# Patient Record
Sex: FEMALE | Race: WHITE | HISPANIC OR LATINO | URBAN - METROPOLITAN AREA
[De-identification: names, ages, dates, MRNs, and addresses within clinical notes are randomized per-mention and may not be internally consistent; named-entity substitution may affect disease eponyms.]

---

## 2021-08-10 ENCOUNTER — TELEPHONE ENCOUNTER (OUTPATIENT)
Dept: URBAN - METROPOLITAN AREA CLINIC 92 | Facility: CLINIC | Age: 31
End: 2021-08-10

## 2021-08-10 ENCOUNTER — OFFICE VISIT (OUTPATIENT)
Dept: URBAN - METROPOLITAN AREA TELEHEALTH 2 | Facility: TELEHEALTH | Age: 31
End: 2021-08-10
Payer: COMMERCIAL

## 2021-08-10 DIAGNOSIS — R10.32 LLQ ABDOMINAL PAIN: ICD-10-CM

## 2021-08-10 DIAGNOSIS — K57.32 DIVERTICULITIS OF LARGE INTESTINE WITHOUT PERFORATION OR ABSCESS WITHOUT BLEEDING: ICD-10-CM

## 2021-08-10 PROCEDURE — 99203 OFFICE O/P NEW LOW 30 MIN: CPT | Performed by: INTERNAL MEDICINE

## 2021-08-10 RX ORDER — METFORMIN HYDROCHLORIDE 500 MG/1
1 TABLET WITH A MEAL TABLET, FILM COATED ORAL ONCE A DAY
Status: ACTIVE | COMMUNITY

## 2021-08-10 NOTE — HPI-TODAY'S VISIT:
Ms. Browne is a 31-year-old female was seen today over telehealth for evaluation of having colonoscopy.  Patient stated she was diagnosed with a past episode of diverticulitis 3 to 4 months ago.  She was at the [facility] emergency room.  She went in with a left lower quadrant abdominal pain.  At the time she was at admits for having severe constipation.  She was seen by [facility] GI who recommended colonoscopy however she could not schedule it because of insurance issues.  She reports having weight loss but some of it was intentional because he was diagnosed with a type 2 diabetes.  Denies any NSAID use.  Other complaints includes a abdominal bloating and discomfort.  She was just diagnosed with being pregnant and she is about 6 weeks and she is waiting to go see GYN.

## 2021-12-22 ENCOUNTER — WEB ENCOUNTER (OUTPATIENT)
Dept: URBAN - NONMETROPOLITAN AREA CLINIC 4 | Facility: CLINIC | Age: 31
End: 2021-12-22

## 2021-12-22 ENCOUNTER — OFFICE VISIT (OUTPATIENT)
Dept: URBAN - NONMETROPOLITAN AREA CLINIC 4 | Facility: CLINIC | Age: 31
End: 2021-12-22
Payer: COMMERCIAL

## 2021-12-22 ENCOUNTER — OFFICE VISIT (OUTPATIENT)
Dept: URBAN - METROPOLITAN AREA CLINIC 54 | Facility: CLINIC | Age: 31
End: 2021-12-22

## 2021-12-22 VITALS
TEMPERATURE: 97.5 F | SYSTOLIC BLOOD PRESSURE: 127 MMHG | DIASTOLIC BLOOD PRESSURE: 73 MMHG | HEIGHT: 61 IN | HEART RATE: 93 BPM | BODY MASS INDEX: 46.07 KG/M2 | WEIGHT: 244 LBS

## 2021-12-22 DIAGNOSIS — K57.92 DIVERTICULITIS: ICD-10-CM

## 2021-12-22 DIAGNOSIS — Z34.90 INTRAUTERINE PREGNANCY: ICD-10-CM

## 2021-12-22 DIAGNOSIS — K59.00 CONSTIPATION, UNSPECIFIED CONSTIPATION TYPE: ICD-10-CM

## 2021-12-22 DIAGNOSIS — I10 HYPERTENSION, UNSPECIFIED TYPE: ICD-10-CM

## 2021-12-22 DIAGNOSIS — E11.9 TYPE 2 DIABETES MELLITUS WITHOUT COMPLICATION, WITHOUT LONG-TERM CURRENT USE OF INSULIN: ICD-10-CM

## 2021-12-22 DIAGNOSIS — E66.01 MORBID (SEVERE) OBESITY DUE TO EXCESS CALORIES: ICD-10-CM

## 2021-12-22 PROCEDURE — 99204 OFFICE O/P NEW MOD 45 MIN: CPT | Performed by: REGISTERED NURSE

## 2021-12-22 RX ORDER — METFORMIN HYDROCHLORIDE 500 MG/1
1 TABLET WITH A MEAL TABLET, FILM COATED ORAL ONCE A DAY
Status: DISCONTINUED | COMMUNITY

## 2021-12-22 RX ORDER — METFORMIN HYDROCHLORIDE 500 MG/1
1 TABLET WITH A MEAL TABLET, FILM COATED ORAL ONCE A DAY
Status: ACTIVE | COMMUNITY

## 2021-12-22 RX ORDER — INSULIN ASPART 100 [IU]/ML
AS DIRECTED INJECTION, SOLUTION INTRAVENOUS; SUBCUTANEOUS
Status: ACTIVE | COMMUNITY

## 2021-12-22 RX ORDER — KRILL/OM-3/DHA/EPA/PHOSPHO/AST 1000-230MG
1 TABLET CAPSULE ORAL ONCE A DAY
Status: ACTIVE | COMMUNITY

## 2021-12-22 RX ORDER — LABETALOL HYDROCHLORIDE 100 MG/1
1 TABLET TABLET ORAL TWICE A DAY
Status: ACTIVE | COMMUNITY

## 2021-12-22 NOTE — HPI-TODAY'S VISIT:
12/22/21: Pt is a 31 female with PMH ofrecurrent  diverticulitis, HTN, DM, obesity and is currently 22w6d pregnant who was referred by [facility] ED for evaluation of diverticulitis.   Pt seen in ED 11/7/21 with c/o LLQ pain and rectal bleeding. CT A/P showed uncomplicated inflamed epiploic appendage versus diverticulitis. There is no abscess or drainable fluid collection. Hepatosplenomegaly also noted. Pt was discharged home on Augmentin, which she completed. She is currently doing well with no further abdominal pain. She reports 3 episodes of diverticulitis since early this year. She reports occasional constipation. She currenlty takes Miralax nightly. She has noticed blood on toilet paper. Admits to straning with BMs. Paternal uncle had colon cancer.

## 2022-01-04 ENCOUNTER — TELEPHONE ENCOUNTER (OUTPATIENT)
Dept: URBAN - NONMETROPOLITAN AREA CLINIC 4 | Facility: CLINIC | Age: 32
End: 2022-01-04

## 2022-01-04 RX ORDER — ONDANSETRON HYDROCHLORIDE 4 MG/1
1 TABLET TABLET, FILM COATED ORAL EVERY 8 HOURS
Qty: 90 TABLET | Refills: 1 | OUTPATIENT
Start: 2022-01-04

## 2022-01-04 RX ORDER — AMOXICILLIN AND CLAVULANATE POTASSIUM 500; 125 MG/1; 1/1
1 TABLET TABLET, FILM COATED ORAL
Qty: 21 | OUTPATIENT
Start: 2022-01-04 | End: 2022-01-11

## 2022-03-21 ENCOUNTER — TELEPHONE ENCOUNTER (OUTPATIENT)
Dept: URBAN - NONMETROPOLITAN AREA CLINIC 4 | Facility: CLINIC | Age: 32
End: 2022-03-21

## 2022-03-23 ENCOUNTER — OFFICE VISIT (OUTPATIENT)
Dept: URBAN - METROPOLITAN AREA CLINIC 54 | Facility: CLINIC | Age: 32
End: 2022-03-23

## 2022-03-30 ENCOUNTER — OFFICE VISIT (OUTPATIENT)
Dept: URBAN - METROPOLITAN AREA CLINIC 54 | Facility: CLINIC | Age: 32
End: 2022-03-30

## 2022-07-19 ENCOUNTER — TELEPHONE ENCOUNTER (OUTPATIENT)
Dept: URBAN - NONMETROPOLITAN AREA CLINIC 4 | Facility: CLINIC | Age: 32
End: 2022-07-19

## 2022-07-19 RX ORDER — AMOXICILLIN AND CLAVULANATE POTASSIUM 500; 125 MG/1; 1/1
1 TABLET TABLET, FILM COATED ORAL
Qty: 21 | Refills: 0

## 2022-08-12 ENCOUNTER — OFFICE VISIT (OUTPATIENT)
Dept: URBAN - NONMETROPOLITAN AREA CLINIC 4 | Facility: CLINIC | Age: 32
End: 2022-08-12
Payer: COMMERCIAL

## 2022-08-12 ENCOUNTER — LAB OUTSIDE AN ENCOUNTER (OUTPATIENT)
Dept: URBAN - NONMETROPOLITAN AREA CLINIC 4 | Facility: CLINIC | Age: 32
End: 2022-08-12

## 2022-08-12 VITALS
TEMPERATURE: 97.9 F | SYSTOLIC BLOOD PRESSURE: 125 MMHG | HEART RATE: 79 BPM | DIASTOLIC BLOOD PRESSURE: 81 MMHG | HEIGHT: 61 IN | WEIGHT: 218.4 LBS | BODY MASS INDEX: 41.23 KG/M2

## 2022-08-12 DIAGNOSIS — E66.01 MORBID (SEVERE) OBESITY DUE TO EXCESS CALORIES: ICD-10-CM

## 2022-08-12 DIAGNOSIS — E11.9 TYPE 2 DIABETES MELLITUS WITHOUT COMPLICATION, WITHOUT LONG-TERM CURRENT USE OF INSULIN: ICD-10-CM

## 2022-08-12 DIAGNOSIS — F41.8 OTHER SPECIFIED ANXIETY DISORDERS: ICD-10-CM

## 2022-08-12 DIAGNOSIS — I10 HYPERTENSION, UNSPECIFIED TYPE: ICD-10-CM

## 2022-08-12 DIAGNOSIS — R00.2 PALPITATIONS: ICD-10-CM

## 2022-08-12 DIAGNOSIS — K57.92 DIVERTICULITIS: ICD-10-CM

## 2022-08-12 PROCEDURE — 99214 OFFICE O/P EST MOD 30 MIN: CPT | Performed by: REGISTERED NURSE

## 2022-08-12 RX ORDER — INSULIN ASPART 100 [IU]/ML
AS DIRECTED INJECTION, SOLUTION INTRAVENOUS; SUBCUTANEOUS
Status: ACTIVE | COMMUNITY

## 2022-08-12 RX ORDER — FERROUS SULFATE 325(65) MG
1 TABLET TABLET ORAL ONCE A DAY
Status: ACTIVE | COMMUNITY

## 2022-08-12 RX ORDER — LABETALOL HYDROCHLORIDE 100 MG/1
1 TABLET TABLET ORAL TWICE A DAY
Status: ACTIVE | COMMUNITY

## 2022-08-12 RX ORDER — KRILL/OM-3/DHA/EPA/PHOSPHO/AST 1000-230MG
1 TABLET CAPSULE ORAL ONCE A DAY
Status: ACTIVE | COMMUNITY

## 2022-08-12 RX ORDER — AMOXICILLIN AND CLAVULANATE POTASSIUM 500; 125 MG/1; 1/1
1 TABLET TABLET, FILM COATED ORAL
Qty: 21 | Refills: 0 | Status: DISCONTINUED | COMMUNITY

## 2022-08-12 RX ORDER — INSULIN GLARGINE 100 [IU]/ML
AS DIRECTED INJECTION, SOLUTION SUBCUTANEOUS
Status: ACTIVE | COMMUNITY

## 2022-08-12 RX ORDER — ONDANSETRON HYDROCHLORIDE 4 MG/1
1 TABLET TABLET, FILM COATED ORAL EVERY 8 HOURS
Qty: 90 TABLET | Refills: 1 | Status: DISCONTINUED | COMMUNITY
Start: 2022-01-04

## 2022-08-12 RX ORDER — POLYETHYLENE GLYCOL-3350 AND ELECTROLYTES 236; 6.74; 5.86; 2.97; 22.74 G/274.31G; G/274.31G; G/274.31G; G/274.31G; G/274.31G
AS DIRECTED POWDER, FOR SOLUTION ORAL
Qty: 1 KIT | Refills: 0 | OUTPATIENT

## 2022-08-12 NOTE — HPI-TODAY'S VISIT:
12/22/21: Pt is a 31 female with PMH ofrecurrent  diverticulitis, HTN, DM, obesity and is currently 22w6d pregnant who was referred by [facility] ED for evaluation of diverticulitis.   Pt seen in ED 11/7/21 with c/o LLQ pain and rectal bleeding. CT A/P showed uncomplicated inflamed epiploic appendage versus diverticulitis. There is no abscess or drainable fluid collection. Hepatosplenomegaly also noted. Pt was discharged home on Augmentin, which she completed. She is currently doing well with no further abdominal pain. She reports 3 episodes of diverticulitis since early this year. She reports occasional constipation. She currenlty takes Miralax nightly. She has noticed blood on toilet paper. Admits to straning with BMs. Paternal uncle had colon cancer.  8/12/22: Pt RTC for f/u. She is 4 months postpartum. She reports heart palpations and is currently wearing a heart monitor. She has f/u with cards 8/26/22. She denies further abdomnial pain.

## 2022-08-22 ENCOUNTER — TELEPHONE ENCOUNTER (OUTPATIENT)
Dept: URBAN - METROPOLITAN AREA CLINIC 6 | Facility: CLINIC | Age: 32
End: 2022-08-22

## 2022-08-22 RX ORDER — AMOXICILLIN AND CLAVULANATE POTASSIUM 500; 125 MG/1; 1/1
1 TABLET TABLET, FILM COATED ORAL
Qty: 21 | Refills: 0
End: 2022-08-29

## 2022-09-14 ENCOUNTER — TELEPHONE ENCOUNTER (OUTPATIENT)
Dept: URBAN - NONMETROPOLITAN AREA CLINIC 4 | Facility: CLINIC | Age: 32
End: 2022-09-14

## 2022-09-16 ENCOUNTER — OFFICE VISIT (OUTPATIENT)
Dept: URBAN - NONMETROPOLITAN AREA CLINIC 4 | Facility: CLINIC | Age: 32
End: 2022-09-16
Payer: COMMERCIAL

## 2022-09-16 VITALS
HEIGHT: 61 IN | SYSTOLIC BLOOD PRESSURE: 122 MMHG | DIASTOLIC BLOOD PRESSURE: 79 MMHG | HEART RATE: 71 BPM | BODY MASS INDEX: 40.67 KG/M2 | TEMPERATURE: 96.7 F | WEIGHT: 215.4 LBS

## 2022-09-16 DIAGNOSIS — E11.9 TYPE 2 DIABETES MELLITUS WITHOUT COMPLICATION, WITHOUT LONG-TERM CURRENT USE OF INSULIN: ICD-10-CM

## 2022-09-16 DIAGNOSIS — F41.8 OTHER SPECIFIED ANXIETY DISORDERS: ICD-10-CM

## 2022-09-16 DIAGNOSIS — I10 HYPERTENSION, UNSPECIFIED TYPE: ICD-10-CM

## 2022-09-16 DIAGNOSIS — R00.2 PALPITATIONS: ICD-10-CM

## 2022-09-16 DIAGNOSIS — R10.12 LEFT UPPER QUADRANT ABDOMINAL PAIN: ICD-10-CM

## 2022-09-16 DIAGNOSIS — K57.92 DIVERTICULITIS: ICD-10-CM

## 2022-09-16 DIAGNOSIS — E66.01 MORBID (SEVERE) OBESITY DUE TO EXCESS CALORIES: ICD-10-CM

## 2022-09-16 PROCEDURE — 99214 OFFICE O/P EST MOD 30 MIN: CPT | Performed by: REGISTERED NURSE

## 2022-09-16 RX ORDER — INSULIN ASPART 100 [IU]/ML
AS DIRECTED INJECTION, SOLUTION INTRAVENOUS; SUBCUTANEOUS
Status: ACTIVE | COMMUNITY

## 2022-09-16 RX ORDER — INSULIN GLARGINE 100 [IU]/ML
AS DIRECTED INJECTION, SOLUTION SUBCUTANEOUS
Status: ACTIVE | COMMUNITY

## 2022-09-16 RX ORDER — LABETALOL HYDROCHLORIDE 100 MG/1
1 TABLET TABLET ORAL TWICE A DAY
Status: ACTIVE | COMMUNITY

## 2022-09-16 RX ORDER — KRILL/OM-3/DHA/EPA/PHOSPHO/AST 1000-230MG
1 TABLET CAPSULE ORAL ONCE A DAY
Status: ACTIVE | COMMUNITY

## 2022-09-16 RX ORDER — POLYETHYLENE GLYCOL-3350 AND ELECTROLYTES 236; 6.74; 5.86; 2.97; 22.74 G/274.31G; G/274.31G; G/274.31G; G/274.31G; G/274.31G
AS DIRECTED POWDER, FOR SOLUTION ORAL
Qty: 1 KIT | Refills: 0 | Status: ACTIVE | COMMUNITY

## 2022-09-16 RX ORDER — FERROUS SULFATE 325(65) MG
1 TABLET TABLET ORAL ONCE A DAY
Status: ACTIVE | COMMUNITY

## 2022-09-16 NOTE — HPI-TODAY'S VISIT:
12/22/21: Pt is a 31 female with PMH of recurrent  diverticulitis, HTN, DM, obesity and is currently 22w6d pregnant who was referred by [facility] ED for evaluation of diverticulitis.   Pt seen in ED 11/7/21 with c/o LLQ pain and rectal bleeding. CT A/P showed uncomplicated inflamed epiploic appendage versus diverticulitis. There is no abscess or drainable fluid collection. Hepatosplenomegaly also noted. Pt was discharged home on Augmentin, which she completed. She is currently doing well with no further abdominal pain. She reports 3 episodes of diverticulitis since early this year. She reports occasional constipation. She currenlty takes Miralax nightly. She has noticed blood on toilet paper. Admits to straning with BMs. Paternal uncle had colon cancer.  8/12/22: Pt RTC for f/u. She is 4 months postpartum. She reports heart palpations and is currently wearing a heart monitor. She has f/u with cards 8/26/22. She denies further abdomnial pain.  9/16/22: Pt RTC for hospital f/u. She has been seen in the ED 3 times over the past 4 days with c/o LUQ abdominal pain and nausea. States she had eaten [employer] and taken Abx and antifungal medication on empty stomach prior to symptom onset. In the ED, CT scan showed no abnormalities and labs normal.  She received GI coctail, which helped. Denies any vomiting, fever, chest pain, shortness of breath, bowel changes, urinary complaints or sick contacts. Since discharge from ED on 9/14, she feels much better. She admits to feeling very anxious and worries she has cancer. States she was supposed to see a psychiatrist, but appt got canceled.

## 2022-09-27 PROBLEM — 307496006: Status: ACTIVE | Noted: 2021-12-22

## 2022-09-29 ENCOUNTER — OFFICE VISIT (OUTPATIENT)
Dept: URBAN - METROPOLITAN AREA SURGERY CENTER 14 | Facility: SURGERY CENTER | Age: 32
End: 2022-09-29

## 2022-10-06 ENCOUNTER — TELEPHONE ENCOUNTER (OUTPATIENT)
Dept: URBAN - NONMETROPOLITAN AREA CLINIC 4 | Facility: CLINIC | Age: 32
End: 2022-10-06

## 2022-10-06 RX ORDER — DICYCLOMINE HYDROCHLORIDE 20 MG/1
1 TABLET TABLET ORAL THREE TIMES A DAY
Qty: 90 | Refills: 1 | OUTPATIENT

## 2022-10-19 ENCOUNTER — OFFICE VISIT (OUTPATIENT)
Dept: URBAN - METROPOLITAN AREA SURGERY CENTER 14 | Facility: SURGERY CENTER | Age: 32
End: 2022-10-19
Payer: COMMERCIAL

## 2022-10-19 DIAGNOSIS — R10.84 ABDOMINAL CRAMPING, GENERALIZED: ICD-10-CM

## 2022-10-19 PROCEDURE — 45378 DIAGNOSTIC COLONOSCOPY: CPT | Performed by: INTERNAL MEDICINE

## 2022-10-19 PROCEDURE — G8907 PT DOC NO EVENTS ON DISCHARG: HCPCS | Performed by: INTERNAL MEDICINE

## 2022-10-19 RX ORDER — DICYCLOMINE HYDROCHLORIDE 20 MG/1
1 TABLET TABLET ORAL THREE TIMES A DAY
Qty: 90 | Refills: 1 | Status: ACTIVE | COMMUNITY

## 2022-10-19 RX ORDER — POLYETHYLENE GLYCOL-3350 AND ELECTROLYTES 236; 6.74; 5.86; 2.97; 22.74 G/274.31G; G/274.31G; G/274.31G; G/274.31G; G/274.31G
AS DIRECTED POWDER, FOR SOLUTION ORAL
Qty: 1 KIT | Refills: 0 | Status: ACTIVE | COMMUNITY

## 2022-10-19 RX ORDER — FERROUS SULFATE 325(65) MG
1 TABLET TABLET ORAL ONCE A DAY
Status: ACTIVE | COMMUNITY

## 2022-10-19 RX ORDER — INSULIN ASPART 100 [IU]/ML
AS DIRECTED INJECTION, SOLUTION INTRAVENOUS; SUBCUTANEOUS
Status: ACTIVE | COMMUNITY

## 2022-10-19 RX ORDER — LABETALOL HYDROCHLORIDE 100 MG/1
1 TABLET TABLET ORAL TWICE A DAY
Status: ACTIVE | COMMUNITY

## 2022-10-19 RX ORDER — KRILL/OM-3/DHA/EPA/PHOSPHO/AST 1000-230MG
1 TABLET CAPSULE ORAL ONCE A DAY
Status: ACTIVE | COMMUNITY

## 2022-10-19 RX ORDER — INSULIN GLARGINE 100 [IU]/ML
AS DIRECTED INJECTION, SOLUTION SUBCUTANEOUS
Status: ACTIVE | COMMUNITY

## 2022-10-20 ENCOUNTER — OFFICE VISIT (OUTPATIENT)
Dept: URBAN - NONMETROPOLITAN AREA CLINIC 4 | Facility: CLINIC | Age: 32
End: 2022-10-20

## 2022-11-08 ENCOUNTER — TELEPHONE ENCOUNTER (OUTPATIENT)
Dept: URBAN - NONMETROPOLITAN AREA CLINIC 4 | Facility: CLINIC | Age: 32
End: 2022-11-08

## 2022-11-08 RX ORDER — DICYCLOMINE HYDROCHLORIDE 20 MG/1
1 TABLET TABLET ORAL THREE TIMES A DAY
Qty: 90 | Refills: 1
End: 2023-01-07

## 2022-11-18 ENCOUNTER — OFFICE VISIT (OUTPATIENT)
Dept: URBAN - NONMETROPOLITAN AREA CLINIC 4 | Facility: CLINIC | Age: 32
End: 2022-11-18
Payer: COMMERCIAL

## 2022-11-18 VITALS
HEART RATE: 77 BPM | TEMPERATURE: 97.3 F | WEIGHT: 210 LBS | SYSTOLIC BLOOD PRESSURE: 117 MMHG | BODY MASS INDEX: 39.65 KG/M2 | DIASTOLIC BLOOD PRESSURE: 77 MMHG | HEIGHT: 61 IN

## 2022-11-18 DIAGNOSIS — F41.8 OTHER SPECIFIED ANXIETY DISORDERS: ICD-10-CM

## 2022-11-18 DIAGNOSIS — R12 HEARTBURN: ICD-10-CM

## 2022-11-18 DIAGNOSIS — E66.01 MORBID (SEVERE) OBESITY DUE TO EXCESS CALORIES: ICD-10-CM

## 2022-11-18 DIAGNOSIS — E11.9 TYPE 2 DIABETES MELLITUS WITHOUT COMPLICATION, WITHOUT LONG-TERM CURRENT USE OF INSULIN: ICD-10-CM

## 2022-11-18 DIAGNOSIS — Z87.19 HISTORY OF DIVERTICULITIS: ICD-10-CM

## 2022-11-18 DIAGNOSIS — K57.30 SIGMOID DIVERTICULOSIS: ICD-10-CM

## 2022-11-18 DIAGNOSIS — I10 HYPERTENSION, UNSPECIFIED TYPE: ICD-10-CM

## 2022-11-18 PROCEDURE — 99213 OFFICE O/P EST LOW 20 MIN: CPT | Performed by: REGISTERED NURSE

## 2022-11-18 RX ORDER — KRILL/OM-3/DHA/EPA/PHOSPHO/AST 1000-230MG
1 TABLET CAPSULE ORAL ONCE A DAY
Status: ACTIVE | COMMUNITY

## 2022-11-18 RX ORDER — FERROUS SULFATE 325(65) MG
1 TABLET TABLET ORAL ONCE A DAY
Status: ACTIVE | COMMUNITY

## 2022-11-18 RX ORDER — INSULIN GLARGINE 100 [IU]/ML
AS DIRECTED INJECTION, SOLUTION SUBCUTANEOUS
Status: ACTIVE | COMMUNITY

## 2022-11-18 RX ORDER — DICYCLOMINE HYDROCHLORIDE 20 MG/1
1 TABLET TABLET ORAL THREE TIMES A DAY
Qty: 90 | Refills: 1 | Status: DISCONTINUED | COMMUNITY
End: 2023-01-07

## 2022-11-18 RX ORDER — LABETALOL HYDROCHLORIDE 100 MG/1
1 TABLET TABLET ORAL TWICE A DAY
Status: ACTIVE | COMMUNITY

## 2022-11-18 NOTE — HPI-TODAY'S VISIT:
12/22/21: Pt is a 31 female with PMH of recurrent  diverticulitis, HTN, DM, obesity and is currently 22w6d pregnant who was referred by [facility] ED for evaluation of diverticulitis.   Pt seen in ED 11/7/21 with c/o LLQ pain and rectal bleeding. CT A/P showed uncomplicated inflamed epiploic appendage versus diverticulitis. There is no abscess or drainable fluid collection. Hepatosplenomegaly also noted. Pt was discharged home on Augmentin, which she completed. She is currently doing well with no further abdominal pain. She reports 3 episodes of diverticulitis since early this year. She reports occasional constipation. She currenlty takes Miralax nightly. She has noticed blood on toilet paper. Admits to straning with BMs. Paternal uncle had colon cancer.  8/12/22: Pt RTC for f/u. She is 4 months postpartum. She reports heart palpations and is currently wearing a heart monitor. She has f/u with cards 8/26/22. She denies further abdomnial pain.  9/16/22: Pt RTC for hospital f/u. She has been seen in the ED 3 times over the past 4 days with c/o LUQ abdominal pain and nausea. States she had eaten [employer] and taken Abx and antifungal medication on empty stomach prior to symptom onset. In the ED, CT scan showed no abnormalities and labs normal.  She received GI coctail, which helped. Denies any vomiting, fever, chest pain, shortness of breath, bowel changes, urinary complaints or sick contacts. Since discharge from ED on 9/14, she feels much better. She admits to feeling very anxious and worries she has cancer. States she was supposed to see a psychiatrist, but appt got canceled.  11/18/22: Pt RTC for f/u. Had cscope 10/19/22: - The entire examined colon is normal. - Mild sigmoid diverticulosis. - No specimens collected She now c/o heartburn. Admits to eating spicy foods, which makes it worse. Has taken baking soda/water which helped. Denied any abdominal pain.

## 2022-12-07 ENCOUNTER — OFFICE VISIT (OUTPATIENT)
Dept: URBAN - NONMETROPOLITAN AREA CLINIC 4 | Facility: CLINIC | Age: 32
End: 2022-12-07

## 2022-12-07 RX ORDER — FERROUS SULFATE 325(65) MG
1 TABLET TABLET ORAL ONCE A DAY
Status: ACTIVE | COMMUNITY

## 2022-12-07 RX ORDER — LABETALOL HYDROCHLORIDE 100 MG/1
1 TABLET TABLET ORAL TWICE A DAY
Status: ACTIVE | COMMUNITY

## 2022-12-07 RX ORDER — INSULIN GLARGINE 100 [IU]/ML
AS DIRECTED INJECTION, SOLUTION SUBCUTANEOUS
Status: ACTIVE | COMMUNITY

## 2022-12-07 RX ORDER — KRILL/OM-3/DHA/EPA/PHOSPHO/AST 1000-230MG
1 TABLET CAPSULE ORAL ONCE A DAY
Status: ACTIVE | COMMUNITY

## 2022-12-13 ENCOUNTER — TELEPHONE ENCOUNTER (OUTPATIENT)
Dept: URBAN - NONMETROPOLITAN AREA CLINIC 4 | Facility: CLINIC | Age: 32
End: 2022-12-13

## 2022-12-13 RX ORDER — DICYCLOMINE HYDROCHLORIDE 20 MG/1
1 TABLET TABLET ORAL THREE TIMES A DAY
Qty: 90 | Refills: 6

## 2022-12-16 ENCOUNTER — OFFICE VISIT (OUTPATIENT)
Dept: URBAN - METROPOLITAN AREA CLINIC 82 | Facility: CLINIC | Age: 32
End: 2022-12-16
Payer: COMMERCIAL

## 2022-12-16 VITALS
SYSTOLIC BLOOD PRESSURE: 121 MMHG | WEIGHT: 211.8 LBS | HEART RATE: 82 BPM | DIASTOLIC BLOOD PRESSURE: 78 MMHG | BODY MASS INDEX: 39.99 KG/M2 | TEMPERATURE: 97.3 F | HEIGHT: 61 IN

## 2022-12-16 DIAGNOSIS — E66.01 MORBID (SEVERE) OBESITY DUE TO EXCESS CALORIES: ICD-10-CM

## 2022-12-16 DIAGNOSIS — K57.32 DIVERTICULITIS OF LARGE INTESTINE WITHOUT PERFORATION OR ABSCESS WITHOUT BLEEDING: ICD-10-CM

## 2022-12-16 DIAGNOSIS — E11.9 TYPE 2 DIABETES MELLITUS WITHOUT COMPLICATION, WITHOUT LONG-TERM CURRENT USE OF INSULIN: ICD-10-CM

## 2022-12-16 PROBLEM — 4494009: Status: ACTIVE | Noted: 2021-08-10

## 2022-12-16 PROCEDURE — 99214 OFFICE O/P EST MOD 30 MIN: CPT | Performed by: INTERNAL MEDICINE

## 2022-12-16 RX ORDER — KRILL/OM-3/DHA/EPA/PHOSPHO/AST 1000-230MG
1 TABLET CAPSULE ORAL ONCE A DAY
Status: ACTIVE | COMMUNITY

## 2022-12-16 RX ORDER — INSULIN GLARGINE 100 [IU]/ML
AS DIRECTED INJECTION, SOLUTION SUBCUTANEOUS
Status: ACTIVE | COMMUNITY

## 2022-12-16 RX ORDER — FERROUS SULFATE 325(65) MG
1 TABLET TABLET ORAL ONCE A DAY
Status: ACTIVE | COMMUNITY

## 2022-12-16 RX ORDER — LABETALOL HYDROCHLORIDE 100 MG/1
1 TABLET TABLET ORAL TWICE A DAY
Status: ACTIVE | COMMUNITY

## 2022-12-16 RX ORDER — DICYCLOMINE HYDROCHLORIDE 20 MG/1
1 TABLET TABLET ORAL THREE TIMES A DAY
Qty: 90 | Refills: 6 | Status: ACTIVE | COMMUNITY

## 2022-12-16 NOTE — HPI-TODAY'S VISIT:
32-year-old female was seen today for complaints of having left lower quadrant abdominal pain again went to the emergency room on December 10 for left lower quadrant pain and diverticulitis treatment was given.  Patient is having difficulty tolerating Flagyl.  Reports having a colonoscopy October 2022 which was unremarkable except for diverticulosis.  She reports 80 pound weight loss in the past year after she was started on right treatment for diabetes.  She also takes Advil's for frequently for headaches.  CT scan of the abdomen pelvis shows small periumbilical hernia otherwise a moderate diverticulosis was diverticulitis was noted.  Patient denies any unintentional weight loss denies any family history of ulcerative colitis or colon cancer.

## 2022-12-16 NOTE — PHYSICAL EXAM GASTROINTESTINAL
Abdomen ,  obese soft, nontender, nondistended , no guarding or rigidity , no masses palpable , normal bowel sounds , Liver and Spleen , no hepatomegaly present , no hepatosplenomegaly , liver nontender , spleen not palpable

## 2022-12-16 NOTE — PHYSICAL EXAM CONSTITUTIONAL:
obese  well nourished , in no acute distress , ambulating without difficulty , normal communication ability

## 2022-12-27 ENCOUNTER — TELEPHONE ENCOUNTER (OUTPATIENT)
Dept: URBAN - METROPOLITAN AREA CLINIC 82 | Facility: CLINIC | Age: 32
End: 2022-12-27

## 2022-12-27 RX ORDER — AMOXICILLIN AND CLAVULANATE POTASSIUM 500; 125 MG/1; 1/1
1 TABLET TABLET, FILM COATED ORAL
Qty: 30 TABLET | Refills: 0 | OUTPATIENT
Start: 2022-12-28 | End: 2023-01-07

## 2023-02-02 ENCOUNTER — OFFICE VISIT (OUTPATIENT)
Dept: URBAN - METROPOLITAN AREA CLINIC 54 | Facility: CLINIC | Age: 33
End: 2023-02-02

## 2023-02-02 RX ORDER — KRILL/OM-3/DHA/EPA/PHOSPHO/AST 1000-230MG
1 TABLET CAPSULE ORAL ONCE A DAY
COMMUNITY

## 2023-02-02 RX ORDER — LABETALOL HYDROCHLORIDE 100 MG/1
1 TABLET TABLET ORAL TWICE A DAY
COMMUNITY

## 2023-02-02 RX ORDER — INSULIN GLARGINE 100 [IU]/ML
AS DIRECTED INJECTION, SOLUTION SUBCUTANEOUS
COMMUNITY

## 2023-02-02 RX ORDER — DICYCLOMINE HYDROCHLORIDE 20 MG/1
1 TABLET TABLET ORAL THREE TIMES A DAY
Qty: 90 | Refills: 6 | COMMUNITY

## 2023-02-02 RX ORDER — FERROUS SULFATE 325(65) MG
1 TABLET TABLET ORAL ONCE A DAY
COMMUNITY

## 2023-02-07 ENCOUNTER — OFFICE VISIT (OUTPATIENT)
Dept: URBAN - NONMETROPOLITAN AREA CLINIC 4 | Facility: CLINIC | Age: 33
End: 2023-02-07
Payer: COMMERCIAL

## 2023-02-07 ENCOUNTER — LAB OUTSIDE AN ENCOUNTER (OUTPATIENT)
Dept: URBAN - NONMETROPOLITAN AREA CLINIC 4 | Facility: CLINIC | Age: 33
End: 2023-02-07

## 2023-02-07 ENCOUNTER — WEB ENCOUNTER (OUTPATIENT)
Dept: URBAN - METROPOLITAN AREA CLINIC 54 | Facility: CLINIC | Age: 33
End: 2023-02-07

## 2023-02-07 VITALS
SYSTOLIC BLOOD PRESSURE: 117 MMHG | TEMPERATURE: 97.5 F | DIASTOLIC BLOOD PRESSURE: 76 MMHG | WEIGHT: 206.4 LBS | HEART RATE: 77 BPM | BODY MASS INDEX: 38.97 KG/M2 | HEIGHT: 61 IN

## 2023-02-07 DIAGNOSIS — E66.01 MORBID (SEVERE) OBESITY DUE TO EXCESS CALORIES: ICD-10-CM

## 2023-02-07 DIAGNOSIS — I10 HYPERTENSION, UNSPECIFIED TYPE: ICD-10-CM

## 2023-02-07 DIAGNOSIS — R10.11 POSTPRANDIAL ABDOMINAL PAIN IN RIGHT UPPER QUADRANT: ICD-10-CM

## 2023-02-07 DIAGNOSIS — E11.9 TYPE 2 DIABETES MELLITUS WITHOUT COMPLICATION, WITHOUT LONG-TERM CURRENT USE OF INSULIN: ICD-10-CM

## 2023-02-07 DIAGNOSIS — F41.8 OTHER SPECIFIED ANXIETY DISORDERS: ICD-10-CM

## 2023-02-07 DIAGNOSIS — K57.30 SIGMOID DIVERTICULOSIS: ICD-10-CM

## 2023-02-07 DIAGNOSIS — R14.0 ABDOMINAL BLOATING: ICD-10-CM

## 2023-02-07 DIAGNOSIS — R11.0 NAUSEA: ICD-10-CM

## 2023-02-07 DIAGNOSIS — K21.9 GASTROESOPHAGEAL REFLUX DISEASE, UNSPECIFIED WHETHER ESOPHAGITIS PRESENT: ICD-10-CM

## 2023-02-07 DIAGNOSIS — Z87.19 HISTORY OF DIVERTICULITIS: ICD-10-CM

## 2023-02-07 DIAGNOSIS — K76.0 FATTY LIVER: ICD-10-CM

## 2023-02-07 DIAGNOSIS — R68.81 EARLY SATIETY: ICD-10-CM

## 2023-02-07 PROBLEM — 408512008: Status: ACTIVE | Noted: 2021-12-22

## 2023-02-07 PROBLEM — 313436004: Status: ACTIVE | Noted: 2021-12-22

## 2023-02-07 PROBLEM — 38341003: Status: ACTIVE | Noted: 2021-12-22

## 2023-02-07 PROBLEM — 197321007: Status: ACTIVE | Noted: 2023-02-07

## 2023-02-07 PROBLEM — 83911000119104: Status: ACTIVE | Noted: 2021-12-22

## 2023-02-07 PROBLEM — 429430001: Status: ACTIVE | Noted: 2022-11-18

## 2023-02-07 PROBLEM — 197480006: Status: ACTIVE | Noted: 2022-08-12

## 2023-02-07 PROCEDURE — 99214 OFFICE O/P EST MOD 30 MIN: CPT | Performed by: REGISTERED NURSE

## 2023-02-07 RX ORDER — KRILL/OM-3/DHA/EPA/PHOSPHO/AST 1000-230MG
1 TABLET CAPSULE ORAL ONCE A DAY
Status: ACTIVE | COMMUNITY

## 2023-02-07 RX ORDER — FERROUS SULFATE 325(65) MG
1 TABLET TABLET ORAL ONCE A DAY
Status: ACTIVE | COMMUNITY

## 2023-02-07 RX ORDER — DICYCLOMINE HYDROCHLORIDE 20 MG/1
1 TABLET TABLET ORAL THREE TIMES A DAY
Qty: 90 | Refills: 6 | Status: ACTIVE | COMMUNITY

## 2023-02-07 RX ORDER — INSULIN GLARGINE 100 [IU]/ML
AS DIRECTED INJECTION, SOLUTION SUBCUTANEOUS
Status: ACTIVE | COMMUNITY

## 2023-02-07 RX ORDER — LABETALOL HYDROCHLORIDE 100 MG/1
1 TABLET TABLET ORAL TWICE A DAY
Status: ACTIVE | COMMUNITY

## 2023-02-07 RX ORDER — SUCRALFATE 1 G/1
1 TABLET ON AN EMPTY STOMACH TABLET ORAL TWICE A DAY
Qty: 60 | Refills: 0 | OUTPATIENT

## 2023-02-07 NOTE — PHYSICAL EXAM GASTROINTESTINAL
Abdomen,  soft, TTP in RUQ/epigastrium, nondistended,  normal bowel sounds,  Liver and Spleen,  no hepatomegaly present

## 2023-02-07 NOTE — HPI-TODAY'S VISIT:
12/22/21: Pt is a 31 female with PMH of recurrent  diverticulitis, HTN, DM, obesity and is currently 22w6d pregnant who was referred by [facility] ED for evaluation of diverticulitis.   Pt seen in ED 11/7/21 with c/o LLQ pain and rectal bleeding. CT A/P showed uncomplicated inflamed epiploic appendage versus diverticulitis. There is no abscess or drainable fluid collection. Hepatosplenomegaly also noted. Pt was discharged home on Augmentin, which she completed. She is currently doing well with no further abdominal pain. She reports 3 episodes of diverticulitis since early this year. She reports occasional constipation. She currenlty takes Miralax nightly. She has noticed blood on toilet paper. Admits to straning with BMs. Paternal uncle had colon cancer.  8/12/22: Pt RTC for f/u. She is 4 months postpartum. She reports heart palpations and is currently wearing a heart monitor. She has f/u with cards 8/26/22. She denies further abdomnial pain.  9/16/22: Pt RTC for hospital f/u. She has been seen in the ED 3 times over the past 4 days with c/o LUQ abdominal pain and nausea. States she had eaten [employer] and taken Abx and antifungal medication on empty stomach prior to symptom onset. In the ED, CT scan showed no abnormalities and labs normal.  She received GI coctail, which helped. Denies any vomiting, fever, chest pain, shortness of breath, bowel changes, urinary complaints or sick contacts. Since discharge from ED on 9/14, she feels much better. She admits to feeling very anxious and worries she has cancer. States she was supposed to see a psychiatrist, but appt got canceled.  11/18/22: Pt RTC for f/u. Had cscope 10/19/22: - The entire examined colon is normal. - Mild sigmoid diverticulosis. - No specimens collected She now c/o heartburn. Admits to eating spicy foods, which makes it worse. Has taken baking soda/water which helped. Denied any abdominal pain.   12/16/22 (OV by Dr. Luna) 32-year-old female was seen today for complaints of having left lower quadrant abdominal pain again went to the emergency room on December 10 for left lower quadrant pain and diverticulitis treatment was given.  Patient is having difficulty tolerating Flagyl.  Reports having a colonoscopy October 2022 which was unremarkable except for diverticulosis.  She reports 80 pound weight loss in the past year after she was started on right treatment for diabetes.  She also takes Advil's for frequently for headaches.  CT scan of the abdomen pelvis shows small periumbilical hernia otherwise a moderate diverticulosis was diverticulitis was noted.  Patient denies any unintentional weight loss denies any family history of ulcerative colitis or colon cancer.  2/7/23: Pt RTC for f/u. She c/o postprandial RUQ/epigastric/chest pain with associated nausea without vomiting, reflux, bloating, fullness for past week and a half. She has been seen in the ED 9 times during the month of January for these symptoms. Review of records shows the patient had an ultrasound of her right upper quadrant on February 3, a KUB on February 1, a CTA on January 30, a CT stone on January 28, a CT of her abdomen and pelvis on January 7, a transvaginal ultrasound on January 3.  She did have her appendix removed in late December 2022 after a questionable indeterminate possible early appendicitis. She currenlty takes Protonix daily. She is scheduled for HIDA scan on 2/22/23. Continues to c/o generalized abdominal pain and excessive gas. Admits to eating cabbage, broccoli and other gassy foods. Having panic attacks. Has appt with psych coming up.

## 2023-02-08 ENCOUNTER — WEB ENCOUNTER (OUTPATIENT)
Dept: URBAN - METROPOLITAN AREA CLINIC 54 | Facility: CLINIC | Age: 33
End: 2023-02-08

## 2023-02-22 ENCOUNTER — TELEPHONE ENCOUNTER (OUTPATIENT)
Dept: URBAN - NONMETROPOLITAN AREA CLINIC 4 | Facility: CLINIC | Age: 33
End: 2023-02-22

## 2023-03-02 ENCOUNTER — WEB ENCOUNTER (OUTPATIENT)
Dept: URBAN - METROPOLITAN AREA CLINIC 54 | Facility: CLINIC | Age: 33
End: 2023-03-02

## 2023-03-02 ENCOUNTER — OFFICE VISIT (OUTPATIENT)
Dept: URBAN - METROPOLITAN AREA SURGERY CENTER 14 | Facility: SURGERY CENTER | Age: 33
End: 2023-03-02

## 2023-03-03 ENCOUNTER — WEB ENCOUNTER (OUTPATIENT)
Dept: URBAN - METROPOLITAN AREA CLINIC 54 | Facility: CLINIC | Age: 33
End: 2023-03-03

## 2023-03-03 RX ORDER — AMOXICILLIN AND CLAVULANATE POTASSIUM 500; 125 MG/1; 1/1
1 TABLET TABLET, FILM COATED ORAL
Qty: 30 TABLET | Refills: 0
Start: 2022-12-28 | End: 2023-03-13

## 2023-03-07 ENCOUNTER — WEB ENCOUNTER (OUTPATIENT)
Dept: URBAN - METROPOLITAN AREA CLINIC 54 | Facility: CLINIC | Age: 33
End: 2023-03-07

## 2023-03-07 RX ORDER — AMOXICILLIN AND CLAVULANATE POTASSIUM 500; 125 MG/1; 1/1
1 TABLET TABLET, FILM COATED ORAL
Qty: 30 TABLET | Refills: 0 | Status: ACTIVE | COMMUNITY
Start: 2022-12-28 | End: 2023-03-13

## 2023-03-07 RX ORDER — LABETALOL HYDROCHLORIDE 100 MG/1
1 TABLET TABLET ORAL TWICE A DAY
Status: ACTIVE | COMMUNITY

## 2023-03-07 RX ORDER — OMEPRAZOLE 20 MG/1
1 TABLET 30 MINUTES BEFORE MORNING MEAL TABLET, ORALLY DISINTEGRATING, DELAYED RELEASE ORAL TWICE A DAY
Qty: 30 TABLET | Refills: 2 | OUTPATIENT
Start: 2023-03-09

## 2023-03-07 RX ORDER — KRILL/OM-3/DHA/EPA/PHOSPHO/AST 1000-230MG
1 TABLET CAPSULE ORAL ONCE A DAY
Status: ACTIVE | COMMUNITY

## 2023-03-07 RX ORDER — PANTOPRAZOLE SODIUM 40 MG/1
1 TABLET TABLET, DELAYED RELEASE ORAL ONCE A DAY
Qty: 30 | Refills: 1 | OUTPATIENT

## 2023-03-07 RX ORDER — DICYCLOMINE HYDROCHLORIDE 20 MG/1
1 TABLET TABLET ORAL THREE TIMES A DAY
Qty: 90 | Refills: 6 | Status: ACTIVE | COMMUNITY

## 2023-03-07 RX ORDER — FERROUS SULFATE 325(65) MG
1 TABLET TABLET ORAL ONCE A DAY
Status: ACTIVE | COMMUNITY

## 2023-03-07 RX ORDER — SUCRALFATE 1 G/1
1 TABLET ON AN EMPTY STOMACH TABLET ORAL TWICE A DAY
Qty: 60 | Refills: 0 | Status: ACTIVE | COMMUNITY

## 2023-03-07 RX ORDER — INSULIN GLARGINE 100 [IU]/ML
AS DIRECTED INJECTION, SOLUTION SUBCUTANEOUS
Status: ACTIVE | COMMUNITY

## 2023-03-09 ENCOUNTER — TELEPHONE ENCOUNTER (OUTPATIENT)
Dept: URBAN - METROPOLITAN AREA CLINIC 54 | Facility: CLINIC | Age: 33
End: 2023-03-09

## 2023-03-10 ENCOUNTER — WEB ENCOUNTER (OUTPATIENT)
Dept: URBAN - NONMETROPOLITAN AREA CLINIC 4 | Facility: CLINIC | Age: 33
End: 2023-03-10

## 2023-03-22 ENCOUNTER — WEB ENCOUNTER (OUTPATIENT)
Dept: URBAN - METROPOLITAN AREA CLINIC 54 | Facility: CLINIC | Age: 33
End: 2023-03-22

## 2023-03-23 ENCOUNTER — CLAIMS CREATED FROM THE CLAIM WINDOW (OUTPATIENT)
Dept: URBAN - METROPOLITAN AREA SURGERY CENTER 14 | Facility: SURGERY CENTER | Age: 33
End: 2023-03-23
Payer: COMMERCIAL

## 2023-03-23 ENCOUNTER — OFFICE VISIT (OUTPATIENT)
Dept: URBAN - METROPOLITAN AREA SURGERY CENTER 14 | Facility: SURGERY CENTER | Age: 33
End: 2023-03-23

## 2023-03-23 ENCOUNTER — TELEPHONE ENCOUNTER (OUTPATIENT)
Dept: URBAN - METROPOLITAN AREA CLINIC 35 | Facility: CLINIC | Age: 33
End: 2023-03-23

## 2023-03-23 DIAGNOSIS — R10.13 ABDOMINAL DISCOMFORT, EPIGASTRIC: ICD-10-CM

## 2023-03-23 DIAGNOSIS — K29.60 ADENOPAPILLOMATOSIS GASTRICA: ICD-10-CM

## 2023-03-23 DIAGNOSIS — K31.89 ACQUIRED DEFORMITY OF DUODENUM: ICD-10-CM

## 2023-03-23 PROCEDURE — 43239 EGD BIOPSY SINGLE/MULTIPLE: CPT | Performed by: INTERNAL MEDICINE

## 2023-03-23 PROCEDURE — G8907 PT DOC NO EVENTS ON DISCHARG: HCPCS | Performed by: INTERNAL MEDICINE

## 2023-03-23 RX ORDER — OMEPRAZOLE 20 MG/1
1 TABLET 30 MINUTES BEFORE MORNING MEAL TABLET, ORALLY DISINTEGRATING, DELAYED RELEASE ORAL TWICE A DAY
Qty: 30 TABLET | Refills: 2 | Status: ACTIVE | COMMUNITY
Start: 2023-03-09

## 2023-03-23 RX ORDER — PANTOPRAZOLE SODIUM 40 MG/1
1 TABLET TABLET, DELAYED RELEASE ORAL ONCE A DAY
Qty: 30 | Refills: 1 | Status: ACTIVE | COMMUNITY

## 2023-03-23 RX ORDER — SUCRALFATE 1 G/1
1 TABLET ON AN EMPTY STOMACH TABLET ORAL TWICE A DAY
Qty: 60 | Refills: 0 | Status: ACTIVE | COMMUNITY

## 2023-03-23 RX ORDER — PANTOPRAZOLE SODIUM 40 MG/1
1 TABLET TABLET, DELAYED RELEASE ORAL TWICE A DAY
Qty: 60 TABLET | Refills: 1 | OUTPATIENT
Start: 2023-03-23

## 2023-03-23 RX ORDER — INSULIN GLARGINE 100 [IU]/ML
AS DIRECTED INJECTION, SOLUTION SUBCUTANEOUS
Status: ACTIVE | COMMUNITY

## 2023-03-23 RX ORDER — DICYCLOMINE HYDROCHLORIDE 20 MG/1
1 TABLET TABLET ORAL THREE TIMES A DAY
Qty: 90 | Refills: 6 | Status: ACTIVE | COMMUNITY

## 2023-03-23 RX ORDER — FERROUS SULFATE 325(65) MG
1 TABLET TABLET ORAL ONCE A DAY
Status: ACTIVE | COMMUNITY

## 2023-03-23 RX ORDER — LABETALOL HYDROCHLORIDE 100 MG/1
1 TABLET TABLET ORAL TWICE A DAY
Status: ACTIVE | COMMUNITY

## 2023-03-23 RX ORDER — KRILL/OM-3/DHA/EPA/PHOSPHO/AST 1000-230MG
1 TABLET CAPSULE ORAL ONCE A DAY
Status: ACTIVE | COMMUNITY

## 2023-03-28 ENCOUNTER — TELEPHONE ENCOUNTER (OUTPATIENT)
Dept: URBAN - NONMETROPOLITAN AREA CLINIC 4 | Facility: CLINIC | Age: 33
End: 2023-03-28

## 2023-03-28 RX ORDER — SUCRALFATE 1 G/1
1 TABLET ON AN EMPTY STOMACH TABLET ORAL TWICE A DAY
Qty: 60 | Refills: 0
End: 2023-04-27

## 2023-03-29 ENCOUNTER — OFFICE VISIT (OUTPATIENT)
Dept: URBAN - NONMETROPOLITAN AREA CLINIC 4 | Facility: CLINIC | Age: 33
End: 2023-03-29

## 2023-03-29 ENCOUNTER — TELEPHONE ENCOUNTER (OUTPATIENT)
Dept: URBAN - NONMETROPOLITAN AREA CLINIC 4 | Facility: CLINIC | Age: 33
End: 2023-03-29

## 2023-03-31 ENCOUNTER — ERX REFILL RESPONSE (OUTPATIENT)
Dept: URBAN - METROPOLITAN AREA CLINIC 54 | Facility: CLINIC | Age: 33
End: 2023-03-31

## 2023-03-31 RX ORDER — PANTOPRAZOLE SODIUM 40 MG/1
1 TABLET TABLET, DELAYED RELEASE ORAL ONCE A DAY
Qty: 30 | Refills: 1 | OUTPATIENT

## 2023-03-31 RX ORDER — PANTOPRAZOLE SODIUM 40 MG/1
TAKE 1 TABLET BY MOUTH EVERY DAY FOR 30 DAYS TABLET, DELAYED RELEASE ORAL
Qty: 30 TABLET | Refills: 1 | OUTPATIENT

## 2023-04-05 ENCOUNTER — ERX REFILL RESPONSE (OUTPATIENT)
Dept: URBAN - METROPOLITAN AREA CLINIC 54 | Facility: CLINIC | Age: 33
End: 2023-04-05

## 2023-04-05 RX ORDER — PANTOPRAZOLE SODIUM 40 MG/1
TAKE 1 TABLET BY MOUTH EVERY DAY FOR 30 DAYS TABLET, DELAYED RELEASE ORAL
Qty: 30 TABLET | Refills: 1 | OUTPATIENT

## 2023-04-05 RX ORDER — PANTOPRAZOLE SODIUM 40 MG/1
TAKE 1 TABLET BY MOUTH EVERY DAY TABLET, DELAYED RELEASE ORAL
Qty: 30 TABLET | Refills: 1 | OUTPATIENT

## 2023-04-12 ENCOUNTER — WEB ENCOUNTER (OUTPATIENT)
Dept: URBAN - NONMETROPOLITAN AREA CLINIC 4 | Facility: CLINIC | Age: 33
End: 2023-04-12

## 2023-04-12 RX ORDER — PANTOPRAZOLE SODIUM 40 MG/1
TAKE 1 TABLET BY MOUTH TABLET, DELAYED RELEASE ORAL TWICE A DAY
Qty: 60 TABLET | Refills: 3

## 2023-04-14 ENCOUNTER — OFFICE VISIT (OUTPATIENT)
Dept: URBAN - METROPOLITAN AREA CLINIC 54 | Facility: CLINIC | Age: 33
End: 2023-04-14
Payer: COMMERCIAL

## 2023-04-14 VITALS
HEIGHT: 61 IN | DIASTOLIC BLOOD PRESSURE: 80 MMHG | BODY MASS INDEX: 39.57 KG/M2 | TEMPERATURE: 97.6 F | SYSTOLIC BLOOD PRESSURE: 133 MMHG | WEIGHT: 209.6 LBS | HEART RATE: 84 BPM

## 2023-04-14 DIAGNOSIS — R68.81 EARLY SATIETY: ICD-10-CM

## 2023-04-14 DIAGNOSIS — Z87.19 HISTORY OF DIVERTICULITIS: ICD-10-CM

## 2023-04-14 DIAGNOSIS — E66.01 MORBID (SEVERE) OBESITY DUE TO EXCESS CALORIES: ICD-10-CM

## 2023-04-14 DIAGNOSIS — I10 HYPERTENSION, UNSPECIFIED TYPE: ICD-10-CM

## 2023-04-14 DIAGNOSIS — K57.30 SIGMOID DIVERTICULOSIS: ICD-10-CM

## 2023-04-14 DIAGNOSIS — F41.8 OTHER SPECIFIED ANXIETY DISORDERS: ICD-10-CM

## 2023-04-14 DIAGNOSIS — K59.09 CHANGE IN BOWEL MOVEMENTS INTERMITTENT CONSTIPATION. URGENCY IN THE MORNING.: ICD-10-CM

## 2023-04-14 DIAGNOSIS — R10.84 ABDOMINAL CRAMPING, GENERALIZED: ICD-10-CM

## 2023-04-14 DIAGNOSIS — R11.0 NAUSEA: ICD-10-CM

## 2023-04-14 DIAGNOSIS — E11.9 TYPE 2 DIABETES MELLITUS WITHOUT COMPLICATION, WITHOUT LONG-TERM CURRENT USE OF INSULIN: ICD-10-CM

## 2023-04-14 PROBLEM — 428882003: Status: ACTIVE | Noted: 2023-04-14

## 2023-04-14 PROCEDURE — 99213 OFFICE O/P EST LOW 20 MIN: CPT

## 2023-04-14 RX ORDER — LABETALOL HYDROCHLORIDE 100 MG/1
1 TABLET TABLET ORAL
Status: ACTIVE | COMMUNITY

## 2023-04-14 RX ORDER — DICYCLOMINE HYDROCHLORIDE 20 MG/1
1 TABLET TABLET ORAL THREE TIMES A DAY
Qty: 90 | Refills: 6 | Status: DISCONTINUED | COMMUNITY

## 2023-04-14 RX ORDER — SUCRALFATE 1 G/1
1 TABLET ON AN EMPTY STOMACH TABLET ORAL TWICE A DAY
Qty: 60 | Refills: 0 | Status: DISCONTINUED | COMMUNITY
End: 2023-04-27

## 2023-04-14 RX ORDER — INSULIN GLARGINE 100 [IU]/ML
AS DIRECTED INJECTION, SOLUTION SUBCUTANEOUS
Status: DISCONTINUED | COMMUNITY

## 2023-04-14 RX ORDER — LINACLOTIDE 145 UG/1
1 CAPSULE AT LEAST 30 MINUTES BEFORE THE FIRST MEAL OF THE DAY ON AN EMPTY STOMACH CAPSULE, GELATIN COATED ORAL ONCE A DAY
Qty: 30 | OUTPATIENT
Start: 2023-04-14 | End: 2023-05-14

## 2023-04-14 RX ORDER — FERROUS SULFATE 325(65) MG
1 TABLET TABLET ORAL ONCE A DAY
Status: DISCONTINUED | COMMUNITY

## 2023-04-14 RX ORDER — PANTOPRAZOLE SODIUM 40 MG/1
TAKE 1 TABLET BY MOUTH TABLET, DELAYED RELEASE ORAL TWICE A DAY
OUTPATIENT

## 2023-04-14 RX ORDER — OMEPRAZOLE 20 MG/1
1 TABLET 30 MINUTES BEFORE MORNING MEAL TABLET, ORALLY DISINTEGRATING, DELAYED RELEASE ORAL TWICE A DAY
Qty: 30 TABLET | Refills: 2 | Status: DISCONTINUED | COMMUNITY
Start: 2023-03-09

## 2023-04-14 RX ORDER — KRILL/OM-3/DHA/EPA/PHOSPHO/AST 1000-230MG
1 TABLET CAPSULE ORAL ONCE A DAY
Status: DISCONTINUED | COMMUNITY

## 2023-04-14 RX ORDER — PANTOPRAZOLE SODIUM 40 MG/1
TAKE 1 TABLET BY MOUTH TABLET, DELAYED RELEASE ORAL TWICE A DAY
Qty: 60 TABLET | Refills: 3 | Status: ACTIVE | COMMUNITY

## 2023-04-14 NOTE — HPI-TODAY'S VISIT:
12/22/21: Pt is a 31 female with PMH of recurrent  diverticulitis, HTN, DM, obesity and is currently 22w6d pregnant who was referred by [facility] ED for evaluation of diverticulitis.   Pt seen in ED 11/7/21 with c/o LLQ pain and rectal bleeding. CT A/P showed uncomplicated inflamed epiploic appendage versus diverticulitis. There is no abscess or drainable fluid collection. Hepatosplenomegaly also noted. Pt was discharged home on Augmentin, which she completed. She is currently doing well with no further abdominal pain. She reports 3 episodes of diverticulitis since early this year. She reports occasional constipation. She currenlty takes Miralax nightly. She has noticed blood on toilet paper. Admits to straning with BMs. Paternal uncle had colon cancer.  8/12/22: Pt RTC for f/u. She is 4 months postpartum. She reports heart palpations and is currently wearing a heart monitor. She has f/u with cards 8/26/22. She denies further abdomnial pain.  9/16/22: Pt RTC for hospital f/u. She has been seen in the ED 3 times over the past 4 days with c/o LUQ abdominal pain and nausea. States she had eaten [employer] and taken Abx and antifungal medication on empty stomach prior to symptom onset. In the ED, CT scan showed no abnormalities and labs normal.  She received GI coctail, which helped. Denies any vomiting, fever, chest pain, shortness of breath, bowel changes, urinary complaints or sick contacts. Since discharge from ED on 9/14, she feels much better. She admits to feeling very anxious and worries she has cancer. States she was supposed to see a psychiatrist, but appt got canceled.  11/18/22: Pt RTC for f/u. Had cscope 10/19/22: - The entire examined colon is normal. - Mild sigmoid diverticulosis. - No specimens collected She now c/o heartburn. Admits to eating spicy foods, which makes it worse. Has taken baking soda/water which helped. Denied any abdominal pain.   12/16/22 (OV by Dr. Luna) 32-year-old female was seen today for complaints of having left lower quadrant abdominal pain again went to the emergency room on December 10 for left lower quadrant pain and diverticulitis treatment was given.  Patient is having difficulty tolerating Flagyl.  Reports having a colonoscopy October 2022 which was unremarkable except for diverticulosis.  She reports 80 pound weight loss in the past year after she was started on right treatment for diabetes.  She also takes Advil's for frequently for headaches.  CT scan of the abdomen pelvis shows small periumbilical hernia otherwise a moderate diverticulosis was diverticulitis was noted.  Patient denies any unintentional weight loss denies any family history of ulcerative colitis or colon cancer.  2/7/23: Pt RTC for f/u. She c/o postprandial RUQ/epigastric/chest pain with associated nausea without vomiting, reflux, bloating, fullness for past week and a half. She has been seen in the ED 9 times during the month of January for these symptoms. Review of records shows the patient had an ultrasound of her right upper quadrant on February 3, a KUB on February 1, a CTA on January 30, a CT stone on January 28, a CT of her abdomen and pelvis on January 7, a transvaginal ultrasound on January 3.  She did have her appendix removed in late December 2022 after a questionable indeterminate possible early appendicitis. She currenlty takes Protonix daily. She is scheduled for HIDA scan on 2/22/23. Continues to c/o generalized abdominal pain and excessive gas. Admits to eating cabbage, broccoli and other gassy foods. Having panic attacks. Has appt with psych coming up.  4/14/23: Pt RTC for unexpected follow up complaining of a "flare up" of diverticulitis. Complains of LLQ pain and constipation for the last week. Having very small BMs only when she takes something - taking Miralax, prune juice, enemas, dulcolax suppository. Since last visit pt has had 10 ED visits for various issues. She also had cholecystecomy on 2/17/23 and an EGD on 3/23, below. She did have acute elevation in her ALT up to 128 on 3/22, but as of 3/30 LFTs returned to baseline with NAFLD - AST 26, ALT 61.  EGD 3/23/23: - Normal esophagus. - Z-line regular, 40 cm from the incisors. - Erythematous mucosa in the stomach. Biopsied. - Normal examined duodenum. Biopsied. Biopsies: Pending

## 2023-04-17 ENCOUNTER — WEB ENCOUNTER (OUTPATIENT)
Dept: URBAN - METROPOLITAN AREA CLINIC 54 | Facility: CLINIC | Age: 33
End: 2023-04-17

## 2023-04-17 RX ORDER — LINACLOTIDE 290 UG/1
1 CAPSULE AT LEAST 30 MINUTES BEFORE THE FIRST MEAL OF THE DAY ON AN EMPTY STOMACH CAPSULE, GELATIN COATED ORAL ONCE A DAY
Qty: 30 | Refills: 1 | OUTPATIENT
Start: 2023-04-19 | End: 2023-06-18

## 2023-04-20 ENCOUNTER — TELEPHONE ENCOUNTER (OUTPATIENT)
Dept: URBAN - METROPOLITAN AREA CLINIC 54 | Facility: CLINIC | Age: 33
End: 2023-04-20

## 2023-04-20 ENCOUNTER — WEB ENCOUNTER (OUTPATIENT)
Dept: URBAN - METROPOLITAN AREA CLINIC 54 | Facility: CLINIC | Age: 33
End: 2023-04-20

## 2023-04-25 ENCOUNTER — TELEPHONE ENCOUNTER (OUTPATIENT)
Dept: URBAN - METROPOLITAN AREA CLINIC 54 | Facility: CLINIC | Age: 33
End: 2023-04-25

## 2023-04-26 ENCOUNTER — WEB ENCOUNTER (OUTPATIENT)
Dept: URBAN - METROPOLITAN AREA CLINIC 54 | Facility: CLINIC | Age: 33
End: 2023-04-26

## 2023-04-26 RX ORDER — METRONIDAZOLE 500 MG/1
1 TABLET TABLET ORAL THREE TIMES A DAY
Qty: 30 TABLET | Refills: 0 | OUTPATIENT
Start: 2023-04-27 | End: 2023-05-07

## 2023-04-26 RX ORDER — CIPROFLOXACIN 500 MG/1
1 TABLET TABLET, FILM COATED ORAL
Qty: 20 TABLET | Refills: 0 | OUTPATIENT
Start: 2023-04-27 | End: 2023-05-07

## 2023-04-27 ENCOUNTER — TELEPHONE ENCOUNTER (OUTPATIENT)
Dept: URBAN - METROPOLITAN AREA CLINIC 54 | Facility: CLINIC | Age: 33
End: 2023-04-27

## 2023-05-02 ENCOUNTER — WEB ENCOUNTER (OUTPATIENT)
Dept: URBAN - METROPOLITAN AREA CLINIC 54 | Facility: CLINIC | Age: 33
End: 2023-05-02

## 2023-05-03 ENCOUNTER — OFFICE VISIT (OUTPATIENT)
Dept: URBAN - NONMETROPOLITAN AREA CLINIC 4 | Facility: CLINIC | Age: 33
End: 2023-05-03
Payer: COMMERCIAL

## 2023-05-03 ENCOUNTER — WEB ENCOUNTER (OUTPATIENT)
Dept: URBAN - NONMETROPOLITAN AREA CLINIC 4 | Facility: CLINIC | Age: 33
End: 2023-05-03

## 2023-05-03 VITALS
BODY MASS INDEX: 38.89 KG/M2 | HEART RATE: 78 BPM | WEIGHT: 206 LBS | HEIGHT: 61 IN | SYSTOLIC BLOOD PRESSURE: 118 MMHG | TEMPERATURE: 98.8 F | DIASTOLIC BLOOD PRESSURE: 80 MMHG

## 2023-05-03 DIAGNOSIS — F41.8 OTHER SPECIFIED ANXIETY DISORDERS: ICD-10-CM

## 2023-05-03 DIAGNOSIS — E11.9 TYPE 2 DIABETES MELLITUS WITHOUT COMPLICATION, WITHOUT LONG-TERM CURRENT USE OF INSULIN: ICD-10-CM

## 2023-05-03 DIAGNOSIS — R68.81 EARLY SATIETY: ICD-10-CM

## 2023-05-03 DIAGNOSIS — E66.01 MORBID (SEVERE) OBESITY DUE TO EXCESS CALORIES: ICD-10-CM

## 2023-05-03 DIAGNOSIS — K59.09 CHANGE IN BOWEL MOVEMENTS INTERMITTENT CONSTIPATION. URGENCY IN THE MORNING.: ICD-10-CM

## 2023-05-03 DIAGNOSIS — R11.0 NAUSEA: ICD-10-CM

## 2023-05-03 DIAGNOSIS — I10 HYPERTENSION, UNSPECIFIED TYPE: ICD-10-CM

## 2023-05-03 DIAGNOSIS — Z87.19 HISTORY OF DIVERTICULITIS: ICD-10-CM

## 2023-05-03 DIAGNOSIS — K57.30 SIGMOID DIVERTICULOSIS: ICD-10-CM

## 2023-05-03 DIAGNOSIS — K76.0 FATTY LIVER: ICD-10-CM

## 2023-05-03 PROBLEM — 87522002: Status: ACTIVE | Noted: 2023-05-03

## 2023-05-03 PROBLEM — 235595009: Status: ACTIVE | Noted: 2023-02-07

## 2023-05-03 PROCEDURE — 99214 OFFICE O/P EST MOD 30 MIN: CPT | Performed by: REGISTERED NURSE

## 2023-05-03 RX ORDER — CIPROFLOXACIN 500 MG/1
1 TABLET TABLET, FILM COATED ORAL
Qty: 20 TABLET | Refills: 0 | Status: ON HOLD | COMMUNITY
Start: 2023-04-27 | End: 2023-05-07

## 2023-05-03 RX ORDER — LINACLOTIDE 290 UG/1
1 CAPSULE AT LEAST 30 MINUTES BEFORE THE FIRST MEAL OF THE DAY ON AN EMPTY STOMACH CAPSULE, GELATIN COATED ORAL ONCE A DAY
Qty: 30 | Refills: 1 | Status: ACTIVE | COMMUNITY
Start: 2023-04-19 | End: 2023-06-18

## 2023-05-03 RX ORDER — PANTOPRAZOLE SODIUM 40 MG/1
TAKE 1 TABLET BY MOUTH TABLET, DELAYED RELEASE ORAL TWICE A DAY
Status: ACTIVE | COMMUNITY

## 2023-05-03 RX ORDER — LABETALOL HYDROCHLORIDE 100 MG/1
1 TABLET TABLET ORAL
Status: ACTIVE | COMMUNITY

## 2023-05-03 RX ORDER — METRONIDAZOLE 500 MG/1
1 TABLET TABLET ORAL THREE TIMES A DAY
Qty: 30 TABLET | Refills: 0 | Status: ON HOLD | COMMUNITY
Start: 2023-04-27 | End: 2023-05-07

## 2023-05-03 RX ORDER — SUCRALFATE 1 G/1
1 TABLET ON AN EMPTY STOMACH TABLET ORAL TWICE A DAY
Qty: 60 | Refills: 1 | OUTPATIENT
Start: 2023-05-03 | End: 2023-06-02

## 2023-05-03 NOTE — HPI-TODAY'S VISIT:
12/22/21: Pt is a 31 female with PMH of recurrent  diverticulitis, HTN, DM, obesity and is currently 22w6d pregnant who was referred by [facility] ED for evaluation of diverticulitis.   Pt seen in ED 11/7/21 with c/o LLQ pain and rectal bleeding. CT A/P showed uncomplicated inflamed epiploic appendage versus diverticulitis. There is no abscess or drainable fluid collection. Hepatosplenomegaly also noted. Pt was discharged home on Augmentin, which she completed. She is currently doing well with no further abdominal pain. She reports 3 episodes of diverticulitis since early this year. She reports occasional constipation. She currenlty takes Miralax nightly. She has noticed blood on toilet paper. Admits to straning with BMs. Paternal uncle had colon cancer.  8/12/22: Pt RTC for f/u. She is 4 months postpartum. She reports heart palpations and is currently wearing a heart monitor. She has f/u with cards 8/26/22. She denies further abdomnial pain.  9/16/22: Pt RTC for hospital f/u. She has been seen in the ED 3 times over the past 4 days with c/o LUQ abdominal pain and nausea. States she had eaten [employer] and taken Abx and antifungal medication on empty stomach prior to symptom onset. In the ED, CT scan showed no abnormalities and labs normal.  She received GI coctail, which helped. Denies any vomiting, fever, chest pain, shortness of breath, bowel changes, urinary complaints or sick contacts. Since discharge from ED on 9/14, she feels much better. She admits to feeling very anxious and worries she has cancer. States she was supposed to see a psychiatrist, but appt got canceled.  11/18/22: Pt RTC for f/u. Had cscope 10/19/22: - The entire examined colon is normal. - Mild sigmoid diverticulosis. - No specimens collected She now c/o heartburn. Admits to eating spicy foods, which makes it worse. Has taken baking soda/water which helped. Denied any abdominal pain.   12/16/22 (OV by Dr. Luna) 32-year-old female was seen today for complaints of having left lower quadrant abdominal pain again went to the emergency room on December 10 for left lower quadrant pain and diverticulitis treatment was given.  Patient is having difficulty tolerating Flagyl.  Reports having a colonoscopy October 2022 which was unremarkable except for diverticulosis.  She reports 80 pound weight loss in the past year after she was started on right treatment for diabetes.  She also takes Advil's for frequently for headaches.  CT scan of the abdomen pelvis shows small periumbilical hernia otherwise a moderate diverticulosis was diverticulitis was noted.  Patient denies any unintentional weight loss denies any family history of ulcerative colitis or colon cancer.  2/7/23: Pt RTC for f/u. She c/o postprandial RUQ/epigastric/chest pain with associated nausea without vomiting, reflux, bloating, fullness for past week and a half. She has been seen in the ED 9 times during the month of January for these symptoms. Review of records shows the patient had an ultrasound of her right upper quadrant on February 3, a KUB on February 1, a CTA on January 30, a CT stone on January 28, a CT of her abdomen and pelvis on January 7, a transvaginal ultrasound on January 3.  She did have her appendix removed in late December 2022 after a questionable indeterminate possible early appendicitis. She currenlty takes Protonix daily. She is scheduled for HIDA scan on 2/22/23. Continues to c/o generalized abdominal pain and excessive gas. Admits to eating cabbage, broccoli and other gassy foods. Having panic attacks. Has appt with psych coming up.  4/14/23: Pt RTC for unexpected follow up complaining of a "flare up" of diverticulitis. Complains of LLQ pain and constipation for the last week. Having very small BMs only when she takes something - taking Miralax, prune juice, enemas, dulcolax suppository. Since last visit pt has had 10 ED visits for various issues. She also had cholecystecomy on 2/17/23 and an EGD on 3/23, below. She did have acute elevation in her ALT up to 128 on 3/22, but as of 3/30 LFTs returned to baseline with NAFLD - AST 26, ALT 61.  EGD 3/23/23: - Normal esophagus. - Z-line regular, 40 cm from the incisors. - Erythematous mucosa in the stomach. Biopsied. - Normal examined duodenum. Biopsied. Biopsies: Pending  5/3/23: Pt RTC for f/u. She continues to c/o upper abdominal pain that radiates across entire upper abdomen. Described as burning/aching. Has associated bloating, nausea, early satiety. Denies vomiting, melena. Reflux well controlled on Protonix BID. Tried FDgard with some relief, but it makes heartburn worse. Has taken Carafate in past with good response, but not currently taking. Seen by rheumatologist recently who prescribed sulfasalazine for suspected psoriatic arthritis, but it caused worsening abdomnial pain/burning so she stopped taking. She remains on Linzess, but has not taken for past 2 days because her bathroom is getting repaired.

## 2023-05-09 ENCOUNTER — WEB ENCOUNTER (OUTPATIENT)
Dept: URBAN - METROPOLITAN AREA CLINIC 54 | Facility: CLINIC | Age: 33
End: 2023-05-09

## 2023-05-17 ENCOUNTER — OFFICE VISIT (OUTPATIENT)
Dept: URBAN - NONMETROPOLITAN AREA CLINIC 4 | Facility: CLINIC | Age: 33
End: 2023-05-17
Payer: COMMERCIAL

## 2023-05-17 VITALS
BODY MASS INDEX: 39.35 KG/M2 | WEIGHT: 208.4 LBS | SYSTOLIC BLOOD PRESSURE: 136 MMHG | HEART RATE: 87 BPM | HEIGHT: 61 IN | TEMPERATURE: 99 F | DIASTOLIC BLOOD PRESSURE: 76 MMHG

## 2023-05-17 DIAGNOSIS — K21.9 GASTROESOPHAGEAL REFLUX DISEASE, UNSPECIFIED WHETHER ESOPHAGITIS PRESENT: ICD-10-CM

## 2023-05-17 DIAGNOSIS — K76.0 FATTY LIVER: ICD-10-CM

## 2023-05-17 DIAGNOSIS — K59.01 CONSTIPATION: ICD-10-CM

## 2023-05-17 DIAGNOSIS — R14.0 ABDOMINAL BLOATING: ICD-10-CM

## 2023-05-17 PROCEDURE — 99214 OFFICE O/P EST MOD 30 MIN: CPT | Performed by: INTERNAL MEDICINE

## 2023-05-17 RX ORDER — SUCRALFATE 1 G/1
1 TABLET ON AN EMPTY STOMACH TABLET ORAL TWICE A DAY
Status: ACTIVE | COMMUNITY

## 2023-05-17 RX ORDER — LABETALOL HYDROCHLORIDE 100 MG/1
1 TABLET TABLET ORAL
Status: ACTIVE | COMMUNITY

## 2023-05-17 RX ORDER — LINACLOTIDE 290 UG/1
1 CAPSULE AT LEAST 30 MINUTES BEFORE THE FIRST MEAL OF THE DAY ON AN EMPTY STOMACH CAPSULE, GELATIN COATED ORAL ONCE A DAY
Qty: 30 | Refills: 1 | Status: ACTIVE | COMMUNITY
Start: 2023-04-19 | End: 2023-06-18

## 2023-05-17 RX ORDER — SUCRALFATE 1 G/1
1 TABLET ON AN EMPTY STOMACH TABLET ORAL TWICE A DAY
Qty: 60 | Refills: 1 | OUTPATIENT

## 2023-05-17 RX ORDER — PANTOPRAZOLE SODIUM 40 MG/1
TAKE 1 TABLET BY MOUTH TABLET, DELAYED RELEASE ORAL TWICE A DAY
Status: ACTIVE | COMMUNITY

## 2023-05-17 NOTE — HPI-TODAY'S VISIT:
12/22/21: Pt is a 31 female with PMH of recurrent  diverticulitis, HTN, DM, obesity and is currently 22w6d pregnant who was referred by [facility] ED for evaluation of diverticulitis.   Pt seen in ED 11/7/21 with c/o LLQ pain and rectal bleeding. CT A/P showed uncomplicated inflamed epiploic appendage versus diverticulitis. There is no abscess or drainable fluid collection. Hepatosplenomegaly also noted. Pt was discharged home on Augmentin, which she completed. She is currently doing well with no further abdominal pain. She reports 3 episodes of diverticulitis since early this year. She reports occasional constipation. She currenlty takes Miralax nightly. She has noticed blood on toilet paper. Admits to straning with BMs. Paternal uncle had colon cancer.  8/12/22: Pt RTC for f/u. She is 4 months postpartum. She reports heart palpations and is currently wearing a heart monitor. She has f/u with cards 8/26/22. She denies further abdomnial pain.  9/16/22: Pt RTC for hospital f/u. She has been seen in the ED 3 times over the past 4 days with c/o LUQ abdominal pain and nausea. States she had eaten [employer] and taken Abx and antifungal medication on empty stomach prior to symptom onset. In the ED, CT scan showed no abnormalities and labs normal.  She received GI coctail, which helped. Denies any vomiting, fever, chest pain, shortness of breath, bowel changes, urinary complaints or sick contacts. Since discharge from ED on 9/14, she feels much better. She admits to feeling very anxious and worries she has cancer. States she was supposed to see a psychiatrist, but appt got canceled.  11/18/22: Pt RTC for f/u. Had cscope 10/19/22: - The entire examined colon is normal. - Mild sigmoid diverticulosis. - No specimens collected She now c/o heartburn. Admits to eating spicy foods, which makes it worse. Has taken baking soda/water which helped. Denied any abdominal pain.   12/16/22 (OV by Dr. Luna) 32-year-old female was seen today for complaints of having left lower quadrant abdominal pain again went to the emergency room on December 10 for left lower quadrant pain and diverticulitis treatment was given.  Patient is having difficulty tolerating Flagyl.  Reports having a colonoscopy October 2022 which was unremarkable except for diverticulosis.  She reports 80 pound weight loss in the past year after she was started on right treatment for diabetes.  She also takes Advil's for frequently for headaches.  CT scan of the abdomen pelvis shows small periumbilical hernia otherwise a moderate diverticulosis was diverticulitis was noted.  Patient denies any unintentional weight loss denies any family history of ulcerative colitis or colon cancer.  2/7/23: Pt RTC for f/u. She c/o postprandial RUQ/epigastric/chest pain with associated nausea without vomiting, reflux, bloating, fullness for past week and a half. She has been seen in the ED 9 times during the month of January for these symptoms. Review of records shows the patient had an ultrasound of her right upper quadrant on February 3, a KUB on February 1, a CTA on January 30, a CT stone on January 28, a CT of her abdomen and pelvis on January 7, a transvaginal ultrasound on January 3.  She did have her appendix removed in late December 2022 after a questionable indeterminate possible early appendicitis. She currenlty takes Protonix daily. She is scheduled for HIDA scan on 2/22/23. Continues to c/o generalized abdominal pain and excessive gas. Admits to eating cabbage, broccoli and other gassy foods. Having panic attacks. Has appt with psych coming up.  4/14/23: Pt RTC for unexpected follow up complaining of a "flare up" of diverticulitis. Complains of LLQ pain and constipation for the last week. Having very small BMs only when she takes something - taking Miralax, prune juice, enemas, dulcolax suppository. Since last visit pt has had 10 ED visits for various issues. She also had cholecystecomy on 2/17/23 and an EGD on 3/23, below. She did have acute elevation in her ALT up to 128 on 3/22, but as of 3/30 LFTs returned to baseline with NAFLD - AST 26, ALT 61.  EGD 3/23/23: - Normal esophagus. - Z-line regular, 40 cm from the incisors. - Erythematous mucosa in the stomach. Biopsied. - Normal examined duodenum. Biopsied. Biopsies: Pending  5/3/23: Pt RTC for f/u. She continues to c/o upper abdominal pain that radiates across entire upper abdomen. Described as burning/aching. Has associated bloating, nausea, early satiety. Denies vomiting, melena. Reflux well controlled on Protonix BID. Tried FDgard with some relief, but it makes heartburn worse. Has taken Carafate in past with good response, but not currently taking. Seen by rheumatologist recently who prescribed sulfasalazine for suspected psoriatic arthritis, but it caused worsening abdomnial pain/burning so she stopped taking. She remains on Linzess, but has not taken for past 2 days because her bathroom is getting repaired.  Follow Up 5/17/23: Patient presents for scheduled visit. She did not start Carafate until yesterday. She is Protonix 40 mg po BID but continues to have upper abdominal pain and heartburn. EGD was unremarkable. She increased PPI to BID with no effect. Carafate has helped in past. No use of NSAID's. CC did not resolve the pain.

## 2023-05-30 ENCOUNTER — TELEPHONE ENCOUNTER (OUTPATIENT)
Dept: URBAN - NONMETROPOLITAN AREA CLINIC 4 | Facility: CLINIC | Age: 33
End: 2023-05-30

## 2023-05-30 ENCOUNTER — WEB ENCOUNTER (OUTPATIENT)
Dept: URBAN - METROPOLITAN AREA CLINIC 54 | Facility: CLINIC | Age: 33
End: 2023-05-30

## 2023-05-30 RX ORDER — PANTOPRAZOLE SODIUM 40 MG/1
TAKE 1 TABLET BY MOUTH TABLET, DELAYED RELEASE ORAL TWICE A DAY
Qty: 180 | Refills: 1

## 2023-05-30 RX ORDER — LINACLOTIDE 290 UG/1
1 CAPSULE AT LEAST 30 MINUTES BEFORE THE FIRST MEAL OF THE DAY ON AN EMPTY STOMACH CAPSULE, GELATIN COATED ORAL ONCE A DAY
Qty: 30 | Refills: 5
Start: 2023-04-19 | End: 2023-09-27

## 2023-06-01 ENCOUNTER — WEB ENCOUNTER (OUTPATIENT)
Dept: URBAN - METROPOLITAN AREA CLINIC 54 | Facility: CLINIC | Age: 33
End: 2023-06-01

## 2023-06-01 RX ORDER — PANTOPRAZOLE SODIUM 40 MG/1
TAKE 1 TABLET BY MOUTH TABLET, DELAYED RELEASE ORAL TWICE A DAY
Qty: 180 | Refills: 3

## 2023-06-01 RX ORDER — LINACLOTIDE 290 UG/1
1 CAPSULE AT LEAST 30 MINUTES BEFORE THE FIRST MEAL OF THE DAY ON AN EMPTY STOMACH CAPSULE, GELATIN COATED ORAL ONCE A DAY
Qty: 90 | Refills: 3
Start: 2023-04-19 | End: 2023-12-02

## 2023-06-22 ENCOUNTER — WEB ENCOUNTER (OUTPATIENT)
Dept: URBAN - METROPOLITAN AREA CLINIC 54 | Facility: CLINIC | Age: 33
End: 2023-06-22

## 2023-06-22 RX ORDER — PANTOPRAZOLE SODIUM 40 MG/1
TAKE 1 TABLET BY MOUTH TABLET, DELAYED RELEASE ORAL TWICE A DAY
Qty: 180 | Refills: 0

## 2023-06-22 RX ORDER — LINACLOTIDE 290 UG/1
1 CAPSULE AT LEAST 30 MINUTES BEFORE THE FIRST MEAL OF THE DAY ON AN EMPTY STOMACH CAPSULE, GELATIN COATED ORAL ONCE A DAY
Qty: 90 | Refills: 0
Start: 2023-04-19 | End: 2023-09-21

## 2023-06-24 ENCOUNTER — WEB ENCOUNTER (OUTPATIENT)
Dept: URBAN - METROPOLITAN AREA CLINIC 54 | Facility: CLINIC | Age: 33
End: 2023-06-24

## 2023-06-28 ENCOUNTER — WEB ENCOUNTER (OUTPATIENT)
Dept: URBAN - METROPOLITAN AREA CLINIC 54 | Facility: CLINIC | Age: 33
End: 2023-06-28

## 2023-06-29 ENCOUNTER — WEB ENCOUNTER (OUTPATIENT)
Dept: URBAN - NONMETROPOLITAN AREA CLINIC 4 | Facility: CLINIC | Age: 33
End: 2023-06-29

## 2023-08-25 ENCOUNTER — ERX REFILL RESPONSE (OUTPATIENT)
Dept: URBAN - METROPOLITAN AREA CLINIC 54 | Facility: CLINIC | Age: 33
End: 2023-08-25

## 2023-08-25 RX ORDER — LINACLOTIDE 145 UG/1
TAKE 1 CAPSULE BY MOUTH EVERY DAY AT LEAST 30 MINUTES BEFORE THE FIRST MAIN MEAL OF THE DAY ON EMPTY STOMACH FOR 30 DAYS CAPSULE, GELATIN COATED ORAL
Qty: 30 CAPSULE | Refills: 0 | OUTPATIENT

## 2023-08-25 RX ORDER — LINACLOTIDE 145 UG/1
TAKE 1 CAPSULE BY MOUTH EVERY DAY AT LEAST 30 MINUTES BEFORE THE FIRST MAIN MEAL OF THE DAY ON EMPTY STOMACH FOR 30 DAYS CAPSULE, GELATIN COATED ORAL
Qty: 30 CAPSULE | Refills: 5 | OUTPATIENT

## 2023-08-31 ENCOUNTER — OFFICE VISIT (OUTPATIENT)
Dept: URBAN - NONMETROPOLITAN AREA CLINIC 4 | Facility: CLINIC | Age: 33
End: 2023-08-31

## 2023-09-13 ENCOUNTER — TELEPHONE ENCOUNTER (OUTPATIENT)
Dept: URBAN - METROPOLITAN AREA CLINIC 54 | Facility: CLINIC | Age: 33
End: 2023-09-13

## 2023-09-14 ENCOUNTER — DASHBOARD ENCOUNTERS (OUTPATIENT)
Age: 33
End: 2023-09-14

## 2023-09-14 ENCOUNTER — OFFICE VISIT (OUTPATIENT)
Dept: URBAN - NONMETROPOLITAN AREA CLINIC 4 | Facility: CLINIC | Age: 33
End: 2023-09-14
Payer: COMMERCIAL

## 2023-09-14 VITALS
DIASTOLIC BLOOD PRESSURE: 71 MMHG | SYSTOLIC BLOOD PRESSURE: 122 MMHG | TEMPERATURE: 97.7 F | HEART RATE: 88 BPM | BODY MASS INDEX: 39.65 KG/M2 | HEIGHT: 61 IN | WEIGHT: 210 LBS

## 2023-09-14 DIAGNOSIS — K76.0 FATTY LIVER: ICD-10-CM

## 2023-09-14 DIAGNOSIS — K21.9 GASTROESOPHAGEAL REFLUX DISEASE, UNSPECIFIED WHETHER ESOPHAGITIS PRESENT: ICD-10-CM

## 2023-09-14 DIAGNOSIS — K59.09 CHANGE IN BOWEL MOVEMENTS INTERMITTENT CONSTIPATION. URGENCY IN THE MORNING.: ICD-10-CM

## 2023-09-14 DIAGNOSIS — K57.30 SIGMOID DIVERTICULOSIS: ICD-10-CM

## 2023-09-14 PROBLEM — 14760008: Status: ACTIVE | Noted: 2021-12-22

## 2023-09-14 PROCEDURE — 99214 OFFICE O/P EST MOD 30 MIN: CPT | Performed by: REGISTERED NURSE

## 2023-09-14 RX ORDER — PANTOPRAZOLE SODIUM 40 MG/1
TAKE 1 TABLET BY MOUTH TABLET, DELAYED RELEASE ORAL TWICE A DAY
Qty: 180 | Refills: 0 | COMMUNITY

## 2023-09-14 RX ORDER — SUCRALFATE 1 G/1
1 TABLET ON AN EMPTY STOMACH TABLET ORAL TWICE A DAY
Qty: 60 | Refills: 1 | COMMUNITY

## 2023-09-14 RX ORDER — SUCRALFATE 1 G/1
1 TABLET ON AN EMPTY STOMACH TABLET ORAL TWICE A DAY
COMMUNITY

## 2023-09-14 RX ORDER — LINACLOTIDE 290 UG/1
1 CAPSULE AT LEAST 30 MINUTES BEFORE THE FIRST MEAL OF THE DAY ON AN EMPTY STOMACH CAPSULE, GELATIN COATED ORAL ONCE A DAY
Qty: 90 | Refills: 0 | COMMUNITY
Start: 2023-04-19 | End: 2023-09-21

## 2023-09-14 RX ORDER — LABETALOL HYDROCHLORIDE 100 MG/1
1 TABLET TABLET ORAL
COMMUNITY

## 2023-09-14 RX ORDER — LINACLOTIDE 145 UG/1
TAKE 1 CAPSULE BY MOUTH EVERY DAY AT LEAST 30 MINUTES BEFORE THE FIRST MAIN MEAL OF THE DAY ON EMPTY STOMACH FOR 30 DAYS CAPSULE, GELATIN COATED ORAL
Qty: 30 CAPSULE | Refills: 5 | COMMUNITY

## 2023-09-14 NOTE — HPI-TODAY'S VISIT:
12/22/21: Pt is a 31 female with PMH of recurrent  diverticulitis, HTN, DM, obesity and is currently 22w6d pregnant who was referred by [facility] ED for evaluation of diverticulitis.   Pt seen in ED 11/7/21 with c/o LLQ pain and rectal bleeding. CT A/P showed uncomplicated inflamed epiploic appendage versus diverticulitis. There is no abscess or drainable fluid collection. Hepatosplenomegaly also noted. Pt was discharged home on Augmentin, which she completed. She is currently doing well with no further abdominal pain. She reports 3 episodes of diverticulitis since early this year. She reports occasional constipation. She currenlty takes Miralax nightly. She has noticed blood on toilet paper. Admits to straning with BMs. Paternal uncle had colon cancer.  8/12/22: Pt RTC for f/u. She is 4 months postpartum. She reports heart palpations and is currently wearing a heart monitor. She has f/u with cards 8/26/22. She denies further abdomnial pain.  9/16/22: Pt RTC for hospital f/u. She has been seen in the ED 3 times over the past 4 days with c/o LUQ abdominal pain and nausea. States she had eaten [employer] and taken Abx and antifungal medication on empty stomach prior to symptom onset. In the ED, CT scan showed no abnormalities and labs normal.  She received GI coctail, which helped. Denies any vomiting, fever, chest pain, shortness of breath, bowel changes, urinary complaints or sick contacts. Since discharge from ED on 9/14, she feels much better. She admits to feeling very anxious and worries she has cancer. States she was supposed to see a psychiatrist, but appt got canceled.  11/18/22: Pt RTC for f/u. Had cscope 10/19/22: - The entire examined colon is normal. - Mild sigmoid diverticulosis. - No specimens collected She now c/o heartburn. Admits to eating spicy foods, which makes it worse. Has taken baking soda/water which helped. Denied any abdominal pain.   12/16/22 (OV by Dr. Luna) 32-year-old female was seen today for complaints of having left lower quadrant abdominal pain again went to the emergency room on December 10 for left lower quadrant pain and diverticulitis treatment was given.  Patient is having difficulty tolerating Flagyl.  Reports having a colonoscopy October 2022 which was unremarkable except for diverticulosis.  She reports 80 pound weight loss in the past year after she was started on right treatment for diabetes.  She also takes Advil's for frequently for headaches.  CT scan of the abdomen pelvis shows small periumbilical hernia otherwise a moderate diverticulosis was diverticulitis was noted.  Patient denies any unintentional weight loss denies any family history of ulcerative colitis or colon cancer.  2/7/23: Pt RTC for f/u. She c/o postprandial RUQ/epigastric/chest pain with associated nausea without vomiting, reflux, bloating, fullness for past week and a half. She has been seen in the ED 9 times during the month of January for these symptoms. Review of records shows the patient had an ultrasound of her right upper quadrant on February 3, a KUB on February 1, a CTA on January 30, a CT stone on January 28, a CT of her abdomen and pelvis on January 7, a transvaginal ultrasound on January 3.  She did have her appendix removed in late December 2022 after a questionable indeterminate possible early appendicitis. She currenlty takes Protonix daily. She is scheduled for HIDA scan on 2/22/23. Continues to c/o generalized abdominal pain and excessive gas. Admits to eating cabbage, broccoli and other gassy foods. Having panic attacks. Has appt with psych coming up.  4/14/23: Pt RTC for unexpected follow up complaining of a "flare up" of diverticulitis. Complains of LLQ pain and constipation for the last week. Having very small BMs only when she takes something - taking Miralax, prune juice, enemas, dulcolax suppository. Since last visit pt has had 10 ED visits for various issues. She also had cholecystecomy on 2/17/23 and an EGD on 3/23, below. She did have acute elevation in her ALT up to 128 on 3/22, but as of 3/30 LFTs returned to baseline with NAFLD - AST 26, ALT 61.  EGD 3/23/23: - Normal esophagus. - Z-line regular, 40 cm from the incisors. - Erythematous mucosa in the stomach. Biopsied. - Normal examined duodenum. Biopsied. Biopsies: Pending  5/3/23: Pt RTC for f/u. She continues to c/o upper abdominal pain that radiates across entire upper abdomen. Described as burning/aching. Has associated bloating, nausea, early satiety. Denies vomiting, melena. Reflux well controlled on Protonix BID. Tried FDgard with some relief, but it makes heartburn worse. Has taken Carafate in past with good response, but not currently taking. Seen by rheumatologist recently who prescribed sulfasalazine for suspected psoriatic arthritis, but it caused worsening abdomnial pain/burning so she stopped taking. She remains on Linzess, but has not taken for past 2 days because her bathroom is getting repaired.  Follow Up 5/17/23: Patient presents for scheduled visit. She did not start Carafate until yesterday. She is Protonix 40 mg po BID but continues to have upper abdominal pain and heartburn. EGD was unremarkable. She increased PPI to BID with no effect. Carafate has helped in past. No use of NSAID's. CC did not resolve the pain.  9/14/23: Pt RTC with c/o abdominal pain and constipation. She is 10 weeks pregnant. She is no longer taking Linzess. Takes Miralax and Metamucil as needed. Has not had a good BM in quite some time until yesterday. Typically has small BMs and does not feel like she completely evacuates. Takes Protonix BID for GERD. Takes Carafate as needed since it worsens constipation.